# Patient Record
Sex: FEMALE | Race: OTHER | HISPANIC OR LATINO
[De-identification: names, ages, dates, MRNs, and addresses within clinical notes are randomized per-mention and may not be internally consistent; named-entity substitution may affect disease eponyms.]

---

## 2023-04-24 ENCOUNTER — APPOINTMENT (OUTPATIENT)
Dept: ANTEPARTUM | Facility: CLINIC | Age: 28
End: 2023-04-24
Payer: MEDICAID

## 2023-04-24 ENCOUNTER — ASOB RESULT (OUTPATIENT)
Age: 28
End: 2023-04-24

## 2023-04-24 ENCOUNTER — APPOINTMENT (OUTPATIENT)
Dept: ANTEPARTUM | Facility: CLINIC | Age: 28
End: 2023-04-24

## 2023-04-24 PROBLEM — Z00.00 ENCOUNTER FOR PREVENTIVE HEALTH EXAMINATION: Status: ACTIVE | Noted: 2023-04-24

## 2023-04-24 PROCEDURE — 76801 OB US < 14 WKS SINGLE FETUS: CPT

## 2023-04-24 PROCEDURE — 36415 COLL VENOUS BLD VENIPUNCTURE: CPT

## 2023-04-24 PROCEDURE — 76813 OB US NUCHAL MEAS 1 GEST: CPT

## 2023-04-27 LAB
ADDITIONAL US: NORMAL
COMMENTS: AFP: NORMAL
CRL SCAN TWIN B: NORMAL
CRL SCAN: NORMAL
CROWN RUMP LENGTH TWIN B: NORMAL
CROWN RUMP LENGTH: 48.3 MM
DOWN SYNDROME AGE RISK: NORMAL
DOWN SYNDROME INTERPRETATION: NORMAL
DOWN SYNDROME SCREENING RISK: NORMAL
GEST. AGE ON COLLECTION DATE: 11.4 WEEKS
HCG MOM: 0.86
HCG VALUE: 110.4 IU/ML
MATERNAL AGE AT EDD: 28 YR
NOTE: AFP: NORMAL
NT MOM TWIN B: NORMAL
NT TWIN B: NORMAL
NUCHAL TRANSLUCENCY (NT): 1.2 MM
NUCHAL TRANSLUCENCY MOM: 0.92
NUMBER OF FETUSES: 1
PAPP-A MOM: 1.22
PAPP-A VALUE: 941 NG/ML
RACE: NORMAL
RESULTS AFP: NORMAL
SONOGRAPHER ID#: NORMAL
SUBMIT PART 2 SAMPLE USING: NORMAL
TEST RESULTS: AFP: NORMAL
TRISOMY 18 AGE RISK: NORMAL
TRISOMY 18 INTERPRETATION: NORMAL
TRISOMY 18 SCREENING RISK: NORMAL
WEIGHT AFP: 131 LBS

## 2023-05-30 ENCOUNTER — APPOINTMENT (OUTPATIENT)
Dept: ANTEPARTUM | Facility: CLINIC | Age: 28
End: 2023-05-30
Payer: MEDICAID

## 2023-05-30 DIAGNOSIS — N91.2 AMENORRHEA, UNSPECIFIED: ICD-10-CM

## 2023-05-30 DIAGNOSIS — O35.10X0 MATERNAL CARE FOR (SUSPECTED) CHROMOSOMAL ABNORMALITY IN FETUS, UNSPECIFIED, NOT APPLICABLE OR UNSPECIFIED: ICD-10-CM

## 2023-05-30 PROCEDURE — 36415 COLL VENOUS BLD VENIPUNCTURE: CPT

## 2023-06-02 LAB
ADDITIONAL US: NORMAL
AFP MOM: 0.89
AFP VALUE: 33.1 NG/ML
COLLECTED ON 2: NORMAL
COLLECTED ON: NORMAL
CRL SCAN TWIN B: NORMAL
CRL SCAN: NORMAL
CROWN RUMP LENGTH TWIN B: NORMAL
CROWN RUMP LENGTH: 48.3 MM
DIA MOM: 0.96
DIA VALUE: 153.1 PG/ML
DOWN SYNDROME AGE RISK: NORMAL
DOWN SYNDROME INTERPRETATION: NORMAL
DOWN SYNDROME SCREENING RISK: NORMAL
FIRST TRIMESTER SAMPLE: NORMAL
GEST. AGE ON COLLECTION DATE: 11.4 WEEKS
GESTATIONAL AGE: 16.6 WEEKS
HCG MOM: 0.7
HCG VALUE: 24.1 IU/ML
INSULIN DEP DIABETES: NO
MATERNAL AGE AT EDD: 28 YR
NT MOM TWIN B: NORMAL
NT TWIN B: NORMAL
NUCHAL TRANSLUCENCY (NT): 1.2 MM
NUCHAL TRANSLUCENCY MOM: 0.92
NUMBER OF FETUSES: 1
OPEN SPINA BIFIDA: NORMAL
OSB INTERPRETATION: NORMAL
PAPP-A MOM: 1.22
PAPP-A VALUE: 941 NG/ML
RACE: NORMAL
SECOND TRIMESTER SAMPLE: NORMAL
SEQUENTIAL 2 COMMENTS: NORMAL
SEQUENTIAL 2 NOTE: NORMAL
SEQUENTIAL 2 RESULTS: NORMAL
SEQUENTIAL 2 TEST RESULTS: NORMAL
SONOGRAPHER ID#: NORMAL
TRISOMY 18 AGE RISK: NORMAL
TRISOMY 18 INTERPRETATION: NORMAL
TRISOMY 18 SCREENING RISK: NORMAL
UE3 MOM: 1.47
UE3 VALUE: 1.56 NG/ML
WEIGHT AFP: 131 LBS
WEIGHT: 138 LBS

## 2023-06-27 ENCOUNTER — ASOB RESULT (OUTPATIENT)
Age: 28
End: 2023-06-27

## 2023-06-27 ENCOUNTER — APPOINTMENT (OUTPATIENT)
Dept: ANTEPARTUM | Facility: CLINIC | Age: 28
End: 2023-06-27
Payer: MEDICAID

## 2023-06-27 PROCEDURE — 76805 OB US >/= 14 WKS SNGL FETUS: CPT

## 2023-07-03 ENCOUNTER — OUTPATIENT (OUTPATIENT)
Dept: INPATIENT UNIT | Facility: HOSPITAL | Age: 28
LOS: 1 days | End: 2023-07-03
Payer: COMMERCIAL

## 2023-07-03 VITALS — SYSTOLIC BLOOD PRESSURE: 117 MMHG | DIASTOLIC BLOOD PRESSURE: 68 MMHG | HEART RATE: 80 BPM

## 2023-07-03 VITALS
DIASTOLIC BLOOD PRESSURE: 64 MMHG | RESPIRATION RATE: 16 BRPM | TEMPERATURE: 98 F | SYSTOLIC BLOOD PRESSURE: 129 MMHG | HEART RATE: 77 BPM

## 2023-07-03 DIAGNOSIS — O26.899 OTHER SPECIFIED PREGNANCY RELATED CONDITIONS, UNSPECIFIED TRIMESTER: ICD-10-CM

## 2023-07-03 LAB
APPEARANCE UR: CLEAR — SIGNIFICANT CHANGE UP
BILIRUB UR-MCNC: NEGATIVE — SIGNIFICANT CHANGE UP
COLOR SPEC: YELLOW — SIGNIFICANT CHANGE UP
DIFF PNL FLD: ABNORMAL
EPI CELLS # UR: SIGNIFICANT CHANGE UP
GLUCOSE UR QL: NEGATIVE MG/DL — SIGNIFICANT CHANGE UP
KETONES UR-MCNC: NEGATIVE — SIGNIFICANT CHANGE UP
LEUKOCYTE ESTERASE UR-ACNC: NEGATIVE — SIGNIFICANT CHANGE UP
NITRITE UR-MCNC: NEGATIVE — SIGNIFICANT CHANGE UP
PH UR: 6 — SIGNIFICANT CHANGE UP (ref 5–8)
PROT UR-MCNC: 30 MG/DL
RBC CASTS # UR COMP ASSIST: ABNORMAL /HPF (ref 0–4)
SP GR SPEC: 1.02 — SIGNIFICANT CHANGE UP (ref 1.01–1.02)
UROBILINOGEN FLD QL: NEGATIVE MG/DL — SIGNIFICANT CHANGE UP
WBC UR QL: NEGATIVE /HPF — SIGNIFICANT CHANGE UP (ref 0–5)

## 2023-07-03 PROCEDURE — 81001 URINALYSIS AUTO W/SCOPE: CPT

## 2023-07-03 PROCEDURE — 76815 OB US LIMITED FETUS(S): CPT | Mod: 26

## 2023-07-03 PROCEDURE — G0463: CPT

## 2023-07-03 RX ORDER — POLYETHYLENE GLYCOL 3350 17 G/17G
17 POWDER, FOR SOLUTION ORAL ONCE
Refills: 0 | Status: COMPLETED | OUTPATIENT
Start: 2023-07-03 | End: 2023-07-03

## 2023-07-03 RX ORDER — ACETAMINOPHEN 500 MG
975 TABLET ORAL ONCE
Refills: 0 | Status: COMPLETED | OUTPATIENT
Start: 2023-07-03 | End: 2023-07-03

## 2023-07-03 RX ORDER — SIMETHICONE 80 MG/1
80 TABLET, CHEWABLE ORAL ONCE
Refills: 0 | Status: COMPLETED | OUTPATIENT
Start: 2023-07-03 | End: 2023-07-03

## 2023-07-03 RX ADMIN — POLYETHYLENE GLYCOL 3350 17 GRAM(S): 17 POWDER, FOR SOLUTION ORAL at 18:23

## 2023-07-03 RX ADMIN — Medication 975 MILLIGRAM(S): at 20:02

## 2023-07-03 RX ADMIN — SIMETHICONE 80 MILLIGRAM(S): 80 TABLET, CHEWABLE ORAL at 18:24

## 2023-07-03 RX ADMIN — Medication 975 MILLIGRAM(S): at 18:13

## 2023-07-03 NOTE — OB PROVIDER TRIAGE NOTE - HISTORY OF PRESENT ILLNESS
GISELLE ALMANZA is a 27y  at 21wk3d weeks GA who presents to L&D for r/o PTL. To be completed    Pt denies vaginal bleeding, contractions and leakage of fluid. She reports good fetal movement.   Pt denies headaches, visual disturbances, RUQ pain, epigastric pain and new-onset edema.   She denies any urinary complaints. She denies fevers, chills, nausea, vomiting.   She denies shortness of breath, chest pain, and palpitations.    Pregnancy course is  uncomplicated.   Pregnancy course is significant for:    OBHx:  GynHx: -fibroids/-cysts, denied STD hx, denies abnormal PAPs  PMH:  Meds:  SurgHx:  SocHx: Denies tobacco use, EtOH use and illicit drug use during the pregnancy; Feels safe at home  All: NKDA    T(C): 36.8 (23 @ 17:12), Max: 36.8 (23 @ 17:12)  HR: 77 (23 @ 17:17) (77 - 77)  BP: 129/64 (23 @ 17:17) (129/64 - 129/64)  RR: 16 (23 @ 17:12) (16 - 16)  SpO2: --  Gen: NAD, well-appearing  Heart: S1/S2 auscultated with regular rate, normal rhythm.  Lungs: CTAB, no crackles or wheezing  Abd: soft, gravid  Ext: non-edematous, non-tender   SVE:   SSE: cervix visualized, closed with no evidence of bleeding or discharge, no lesions appreciated.    FHT: Cat I, Baseline #bpm, +accels  [place]: Irregular contractions    A/P:     Fetus:  [place]:  Dispo: Continue to observe.     Discussed with    GISELLE ALMANZA is a 27y  at 21wk3d weeks GA who presents to L&D for r/o PTL. Patient reported that she experienced severe pain after bending over and getting back up. She reports pain as 10/10, located in the RLQ. Denies any history of trauma, kidney stones, or surgery.   Pt denies vaginal bleeding, contractions and leakage of fluid.   Pt denies headaches, visual disturbances, RUQ pain, epigastric pain and new-onset edema.   She denies any urinary complaints. She denies fevers, chills, nausea, vomiting.   She denies shortness of breath, chest pain, and palpitations.    Patient interviewed with [place]  ID [id number]    Pregnancy course is otherwise uncomplicated.    OBHx:   GynHx: -fibroids/-cysts, denied STD hx.  PMH: Denies  Meds: PNV  SurgHx: Denies  SocHx: Denies tobacco use, EtOH use and illicit drug use during the pregnancy; Feels safe at home  All: NKDA GISELLE ALMANZA is a 27y  at 21wk3d weeks GA who presents to L&D for r/o PTL. Patient reported that she experienced severe pain after bending over and getting back up. She reports pain as 10/10, located in the RLQ. Denies any history of trauma, kidney stones, or surgery. She reports straining with bowel movement, pain waxes and wanes and migrates.  Pt denies vaginal bleeding, contractions and leakage of fluid.   Pt denies headaches, visual disturbances, RUQ pain, epigastric pain and new-onset edema.   She denies any urinary complaints. She denies fevers, chills, nausea, vomiting.   She denies shortness of breath, chest pain, and palpitations.    Patient interviewed with [place]  ID [id number]    Pregnancy course is otherwise uncomplicated.    OBHx:   GynHx: -fibroids/-cysts, denied STD hx.  PMH: Denies  Meds: PNV  SurgHx: Denies  SocHx: Denies tobacco use, EtOH use and illicit drug use during the pregnancy; Feels safe at home  All: NKDA

## 2023-07-03 NOTE — OB PROVIDER TRIAGE NOTE - NSHPPHYSICALEXAM_GEN_ALL_CORE
T(C): 36.8 (07-03-23 @ 17:12), Max: 36.8 (07-03-23 @ 17:12)  HR: 77 (07-03-23 @ 17:17) (77 - 77)  BP: 129/64 (07-03-23 @ 17:17) (129/64 - 129/64)  RR: 16 (07-03-23 @ 17:12) (16 - 16)    Gen: NAD, well-appearing  Heart: S1/S2 auscultated with regular rate, normal rhythm.  Lungs: CTAB, no crackles or wheezing  Abd: soft, gravid, no CVA tenderness b/l. No TTP.   Ext: non-edematous, non-tender    Bedside Abd Sono: Cephalic, placenta anterior, FHR 150bpm, cervix long and closed.  [place]: No contractions

## 2023-07-03 NOTE — OB PROVIDER TRIAGE NOTE - ATTENDING COMMENTS
G1 at 13cqi2j no signs of previable  labor. Maternal/fetal status reassuring. Discharge home   ppalos

## 2023-07-03 NOTE — OB PROVIDER TRIAGE NOTE - NSOBPROVIDERNOTE_OBGYN_ALL_OB_FT
A/P: GISELLE ALMANZA is a 27y  at 21wk3d weeks GA who presents to L&D for r/o PTL. Patient's physical exam and bedside ultrasound suggest low risk for PTL.    #Abdominal pain  -PTL unlikely  -Tylenol for pain, re-evaluate to see if pain resolves  -F/u with primary Ob    Bedside Abd Sono: Cephalic, placenta anterior, FHR 150bpm, cervix long and closed.  [place]: No contractions  Dispo: Continue to observe, discharge home when pain resolves.    Discussed with Dr. Wylie A/P: GISELLE ALMANZA is a 27y  at 21wk3d weeks GA who presents to L&D for r/o PTL. Patient's physical exam and bedside ultrasound suggest low risk for PTL.    #Abdominal pain  -PTL unlikely  -Tylenol for pain,   -Miralax, Simethicone for possible constipation  -re-evaluate to see if pain resolves  -F/u with primary Ob    Bedside Abd Sono: Cephalic, placenta anterior, FHR 150bpm, cervix long and closed.  [place]: No contractions  Dispo: Continue to observe, discharge home when pain resolves.    Discussed with Dr. Wylie A/P: GISELLE ALMANZA is a 27y  at 21wk3d weeks GA who presents to L&D for r/o PTL. Patient's physical exam and bedside ultrasound suggest low risk for PTL.    #Abdominal pain  -PTL unlikely  -Tylenol for pain,   -Miralax, Simethicone for possible constipation  -re-evaluate to see if pain resolves  -F/u with primary Ob on 23    Bedside Abd Sono: Cephalic, placenta anterior, FHR 150bpm, cervix long and closed.  [place]: No contractions  Dispo: Continue to observe, discharge home with pain improvement    Discussed with Dr. Wylie

## 2023-07-07 DIAGNOSIS — R10.31 RIGHT LOWER QUADRANT PAIN: ICD-10-CM

## 2023-07-07 DIAGNOSIS — Z3A.21 21 WEEKS GESTATION OF PREGNANCY: ICD-10-CM

## 2023-07-07 DIAGNOSIS — O26.892 OTHER SPECIFIED PREGNANCY RELATED CONDITIONS, SECOND TRIMESTER: ICD-10-CM

## 2023-07-11 ENCOUNTER — ASOB RESULT (OUTPATIENT)
Age: 28
End: 2023-07-11

## 2023-07-11 ENCOUNTER — APPOINTMENT (OUTPATIENT)
Dept: ANTEPARTUM | Facility: CLINIC | Age: 28
End: 2023-07-11
Payer: MEDICAID

## 2023-07-11 PROCEDURE — 76816 OB US FOLLOW-UP PER FETUS: CPT

## 2023-08-14 ENCOUNTER — ASOB RESULT (OUTPATIENT)
Age: 28
End: 2023-08-14

## 2023-08-14 ENCOUNTER — APPOINTMENT (OUTPATIENT)
Dept: MATERNAL FETAL MEDICINE | Facility: CLINIC | Age: 28
End: 2023-08-14
Payer: MEDICAID

## 2023-08-14 PROCEDURE — 99442: CPT

## 2023-08-15 ENCOUNTER — APPOINTMENT (OUTPATIENT)
Dept: ANTEPARTUM | Facility: CLINIC | Age: 28
End: 2023-08-15
Payer: MEDICAID

## 2023-08-15 DIAGNOSIS — Z13.71 ENCOUNTER FOR NONPROCREATIVE SCREENING FOR GENETIC DISEASE CARRIER STATUS: ICD-10-CM

## 2023-08-15 PROCEDURE — 36415 COLL VENOUS BLD VENIPUNCTURE: CPT

## 2023-08-28 ENCOUNTER — NON-APPOINTMENT (OUTPATIENT)
Age: 28
End: 2023-08-28

## 2023-09-15 ENCOUNTER — APPOINTMENT (OUTPATIENT)
Dept: ANTEPARTUM | Facility: CLINIC | Age: 28
End: 2023-09-15
Payer: MEDICAID

## 2023-09-15 ENCOUNTER — ASOB RESULT (OUTPATIENT)
Age: 28
End: 2023-09-15

## 2023-09-15 PROCEDURE — 76816 OB US FOLLOW-UP PER FETUS: CPT

## 2023-11-04 ENCOUNTER — INPATIENT (INPATIENT)
Facility: HOSPITAL | Age: 28
LOS: 2 days | Discharge: HOME | End: 2023-11-07
Attending: OBSTETRICS & GYNECOLOGY | Admitting: OBSTETRICS & GYNECOLOGY
Payer: COMMERCIAL

## 2023-11-04 ENCOUNTER — OUTPATIENT (OUTPATIENT)
Dept: OUTPATIENT SERVICES | Facility: HOSPITAL | Age: 28
LOS: 1 days | End: 2023-11-04
Payer: COMMERCIAL

## 2023-11-04 VITALS
DIASTOLIC BLOOD PRESSURE: 74 MMHG | RESPIRATION RATE: 18 BRPM | SYSTOLIC BLOOD PRESSURE: 127 MMHG | HEART RATE: 80 BPM | TEMPERATURE: 99 F

## 2023-11-04 VITALS — HEART RATE: 79 BPM | SYSTOLIC BLOOD PRESSURE: 128 MMHG | DIASTOLIC BLOOD PRESSURE: 81 MMHG

## 2023-11-04 VITALS
SYSTOLIC BLOOD PRESSURE: 125 MMHG | RESPIRATION RATE: 20 BRPM | DIASTOLIC BLOOD PRESSURE: 73 MMHG | HEART RATE: 76 BPM | TEMPERATURE: 98 F

## 2023-11-04 DIAGNOSIS — O26.899 OTHER SPECIFIED PREGNANCY RELATED CONDITIONS, UNSPECIFIED TRIMESTER: ICD-10-CM

## 2023-11-04 DIAGNOSIS — O26.893 OTHER SPECIFIED PREGNANCY RELATED CONDITIONS, THIRD TRIMESTER: ICD-10-CM

## 2023-11-04 LAB
ABO RH CONFIRMATION: SIGNIFICANT CHANGE UP
ABO RH CONFIRMATION: SIGNIFICANT CHANGE UP
BASOPHILS # BLD AUTO: 0.03 K/UL — SIGNIFICANT CHANGE UP (ref 0–0.2)
BASOPHILS # BLD AUTO: 0.03 K/UL — SIGNIFICANT CHANGE UP (ref 0–0.2)
BASOPHILS NFR BLD AUTO: 0.3 % — SIGNIFICANT CHANGE UP (ref 0–2)
BASOPHILS NFR BLD AUTO: 0.3 % — SIGNIFICANT CHANGE UP (ref 0–2)
BLD GP AB SCN SERPL QL: SIGNIFICANT CHANGE UP
BLD GP AB SCN SERPL QL: SIGNIFICANT CHANGE UP
EOSINOPHIL # BLD AUTO: 0.03 K/UL — SIGNIFICANT CHANGE UP (ref 0–0.5)
EOSINOPHIL # BLD AUTO: 0.03 K/UL — SIGNIFICANT CHANGE UP (ref 0–0.5)
EOSINOPHIL NFR BLD AUTO: 0.3 % — SIGNIFICANT CHANGE UP (ref 0–6)
EOSINOPHIL NFR BLD AUTO: 0.3 % — SIGNIFICANT CHANGE UP (ref 0–6)
HCT VFR BLD CALC: 36.9 % — SIGNIFICANT CHANGE UP (ref 34.5–45)
HCT VFR BLD CALC: 36.9 % — SIGNIFICANT CHANGE UP (ref 34.5–45)
HGB BLD-MCNC: 12.4 G/DL — SIGNIFICANT CHANGE UP (ref 11.5–15.5)
HGB BLD-MCNC: 12.4 G/DL — SIGNIFICANT CHANGE UP (ref 11.5–15.5)
IMM GRANULOCYTES NFR BLD AUTO: 0.3 % — SIGNIFICANT CHANGE UP (ref 0–0.9)
IMM GRANULOCYTES NFR BLD AUTO: 0.3 % — SIGNIFICANT CHANGE UP (ref 0–0.9)
LYMPHOCYTES # BLD AUTO: 1.2 K/UL — SIGNIFICANT CHANGE UP (ref 1–3.3)
LYMPHOCYTES # BLD AUTO: 1.2 K/UL — SIGNIFICANT CHANGE UP (ref 1–3.3)
LYMPHOCYTES # BLD AUTO: 11.9 % — LOW (ref 13–44)
LYMPHOCYTES # BLD AUTO: 11.9 % — LOW (ref 13–44)
MCHC RBC-ENTMCNC: 30.5 PG — SIGNIFICANT CHANGE UP (ref 27–34)
MCHC RBC-ENTMCNC: 30.5 PG — SIGNIFICANT CHANGE UP (ref 27–34)
MCHC RBC-ENTMCNC: 33.6 GM/DL — SIGNIFICANT CHANGE UP (ref 32–36)
MCHC RBC-ENTMCNC: 33.6 GM/DL — SIGNIFICANT CHANGE UP (ref 32–36)
MCV RBC AUTO: 90.7 FL — SIGNIFICANT CHANGE UP (ref 80–100)
MCV RBC AUTO: 90.7 FL — SIGNIFICANT CHANGE UP (ref 80–100)
MONOCYTES # BLD AUTO: 0.66 K/UL — SIGNIFICANT CHANGE UP (ref 0–0.9)
MONOCYTES # BLD AUTO: 0.66 K/UL — SIGNIFICANT CHANGE UP (ref 0–0.9)
MONOCYTES NFR BLD AUTO: 6.5 % — SIGNIFICANT CHANGE UP (ref 2–14)
MONOCYTES NFR BLD AUTO: 6.5 % — SIGNIFICANT CHANGE UP (ref 2–14)
NEUTROPHILS # BLD AUTO: 8.15 K/UL — HIGH (ref 1.8–7.4)
NEUTROPHILS # BLD AUTO: 8.15 K/UL — HIGH (ref 1.8–7.4)
NEUTROPHILS NFR BLD AUTO: 80.7 % — HIGH (ref 43–77)
NEUTROPHILS NFR BLD AUTO: 80.7 % — HIGH (ref 43–77)
PLATELET # BLD AUTO: 217 K/UL — SIGNIFICANT CHANGE UP (ref 150–400)
PLATELET # BLD AUTO: 217 K/UL — SIGNIFICANT CHANGE UP (ref 150–400)
RBC # BLD: 4.07 M/UL — SIGNIFICANT CHANGE UP (ref 3.8–5.2)
RBC # BLD: 4.07 M/UL — SIGNIFICANT CHANGE UP (ref 3.8–5.2)
RBC # FLD: 14.1 % — SIGNIFICANT CHANGE UP (ref 10.3–14.5)
RBC # FLD: 14.1 % — SIGNIFICANT CHANGE UP (ref 10.3–14.5)
WBC # BLD: 10.1 K/UL — SIGNIFICANT CHANGE UP (ref 3.8–10.5)
WBC # BLD: 10.1 K/UL — SIGNIFICANT CHANGE UP (ref 3.8–10.5)
WBC # FLD AUTO: 10.1 K/UL — SIGNIFICANT CHANGE UP (ref 3.8–10.5)
WBC # FLD AUTO: 10.1 K/UL — SIGNIFICANT CHANGE UP (ref 3.8–10.5)

## 2023-11-04 PROCEDURE — 83986 ASSAY PH BODY FLUID NOS: CPT

## 2023-11-04 PROCEDURE — G0463: CPT

## 2023-11-04 PROCEDURE — 59025 FETAL NON-STRESS TEST: CPT

## 2023-11-04 RX ORDER — SODIUM CHLORIDE 9 MG/ML
1000 INJECTION, SOLUTION INTRAVENOUS
Refills: 0 | Status: DISCONTINUED | OUTPATIENT
Start: 2023-11-04 | End: 2023-11-07

## 2023-11-04 RX ORDER — OXYTOCIN 10 UNIT/ML
333.33 VIAL (ML) INJECTION
Qty: 20 | Refills: 0 | Status: COMPLETED | OUTPATIENT
Start: 2023-11-04 | End: 2023-11-04

## 2023-11-04 RX ORDER — CHLORHEXIDINE GLUCONATE 213 G/1000ML
1 SOLUTION TOPICAL DAILY
Refills: 0 | Status: DISCONTINUED | OUTPATIENT
Start: 2023-11-04 | End: 2023-11-04

## 2023-11-04 RX ORDER — CITRIC ACID/SODIUM CITRATE 300-500 MG
30 SOLUTION, ORAL ORAL ONCE
Refills: 0 | Status: DISCONTINUED | OUTPATIENT
Start: 2023-11-04 | End: 2023-11-07

## 2023-11-04 RX ORDER — CITRIC ACID/SODIUM CITRATE 300-500 MG
30 SOLUTION, ORAL ORAL ONCE
Refills: 0 | Status: DISCONTINUED | OUTPATIENT
Start: 2023-11-04 | End: 2023-11-04

## 2023-11-04 RX ORDER — OXYTOCIN 10 UNIT/ML
333.33 VIAL (ML) INJECTION
Qty: 20 | Refills: 0 | Status: DISCONTINUED | OUTPATIENT
Start: 2023-11-04 | End: 2023-11-04

## 2023-11-04 RX ORDER — CHLORHEXIDINE GLUCONATE 213 G/1000ML
1 SOLUTION TOPICAL DAILY
Refills: 0 | Status: DISCONTINUED | OUTPATIENT
Start: 2023-11-04 | End: 2023-11-07

## 2023-11-04 RX ORDER — SODIUM CHLORIDE 9 MG/ML
1000 INJECTION, SOLUTION INTRAVENOUS
Refills: 0 | Status: DISCONTINUED | OUTPATIENT
Start: 2023-11-04 | End: 2023-11-04

## 2023-11-04 RX ADMIN — SODIUM CHLORIDE 125 MILLILITER(S): 9 INJECTION, SOLUTION INTRAVENOUS at 15:54

## 2023-11-04 RX ADMIN — SODIUM CHLORIDE 125 MILLILITER(S): 9 INJECTION, SOLUTION INTRAVENOUS at 21:00

## 2023-11-04 RX ADMIN — SODIUM CHLORIDE 125 MILLILITER(S): 9 INJECTION, SOLUTION INTRAVENOUS at 16:45

## 2023-11-04 NOTE — OB PROVIDER TRIAGE NOTE - HISTORY OF PRESENT ILLNESS
GISELLE ALMANZA is a 27y  at 39 weeks 4day GA who presents to L&D for discharge that she noticed overnight. Patient states that she had clear non odorous discharge. Patient denies feeling contractions, but was seen to be grace on monitor, patient states that she is unsure, but does occasionally feel cramping    Pt denies vaginal bleeding, contractions and leakage of fluid. She endorses good fetal movement.   Pt denies trauma. Her last cervical exam was 0/0/-3 on   Pt denies headaches, visual disturbances, RUQ pain, epigastric pain and new-onset edema.     She denies any urinary complaints.   She denies fevers, chills, nausea, vomiting.   She denies shortness of breath, chest pain, and palpitations.    Pregnancy course is  uncomplicated.     POB: G1  PGYN: -fibroids/-cysts, denied STD hx, denies abnormal PAPs  PMH: Denies  PSH: Denies  SH: Denies tobacco use, EtOH use and illicit drug use during the pregnancy; Feels safe at home  Meds: PNV  All: NKDA   GISELLE ALMANZA is a 27y  at 39 weeks 2 day GA who presents to L&D for discharge that she noticed overnight. Patient states that she had clear non odorous discharge. Patient denies feeling contractions, but was seen to be grace on monitor, patient states that she is unsure, but does occasionally feel cramping    Pt denies vaginal bleeding, contractions and leakage of fluid. She endorses good fetal movement.   Pt denies trauma. Her last cervical exam was 0/0/-3 on   Pt denies headaches, visual disturbances, RUQ pain, epigastric pain and new-onset edema.     She denies any urinary complaints.   She denies fevers, chills, nausea, vomiting.   She denies shortness of breath, chest pain, and palpitations.    Pregnancy course is  uncomplicated.     POB: G1  PGYN: -fibroids/-cysts, denied STD hx, denies abnormal PAPs  PMH: Denies  PSH: Denies  SH: Denies tobacco use, EtOH use and illicit drug use during the pregnancy; Feels safe at home  Meds: PNV  All: NKDA

## 2023-11-04 NOTE — OB RN TRIAGE NOTE - FALL HARM RISK - UNIVERSAL INTERVENTIONS
Bed in lowest position, wheels locked, appropriate side rails in place/Call bell, personal items and telephone in reach/Instruct patient to call for assistance before getting out of bed or chair/Non-slip footwear when patient is out of bed/[place] to call system/Physically safe environment - no spills, clutter or unnecessary equipment/Purposeful Proactive Rounding/Room/bathroom lighting operational, light cord in reach

## 2023-11-04 NOTE — OB PROVIDER TRIAGE NOTE - NSOBPROVIDERNOTE_OBGYN_ALL_OB_FT
A/P:  27y  at 39 weeks 4day GA who presents to L&D for discharge that she noticed overnight with contractions    #Rule out rupture  -discharge since last night  -nitrazine negative  -SSE without fluid  -GC/CT and affirm sent    #Rule out labor  -Irregular contractions q4-7 min on monitor  -Patient does not feel contractions  -1/0/-3 on initial triage  -repeat exam    Fetus: Reactive  [place]: q4-7 min; irregular  Dispo: Continue to observe.     Discussed with Dr. Hoover A/P:  27y  at 39 weeks 4day GA who presents to L&D for discharge that she noticed overnight with contractions    #Rule out rupture  -discharge since last night  -nitrazine negative  -SSE without fluid  -GC/CT and affirm sent    #Rule out labor  -Irregular contractions q4-7 min on monitor  -Patient does not feel contractions  -/-3 on initial triage  -repeat exam /-3  -Not in labor    Fetus: Reactive  [place]: q4-7 min; irregular  Dispo: Discharge home with labor precautions and hydration precautions    Discussed with Dr. Hoover

## 2023-11-04 NOTE — OB PROVIDER H&P - ASSESSMENT
A/P:   -Admit to L&D  -Consent  -Admission labs  -NPO, except ice chips   -IV fluids  -Labor: Intact/*ROM. Latent/Active labor. [place] *.   -Fetus: Cat I tracing. Continuous toco and fetal monitoring.   -GBS: Negative, no GBS ppx required   -Analgesia:     Discussed with  _ 27y  at 39w GA by LMP consistent with 1st trimester sono who presents to L&D in labor.    A/P:   -Admit to L&D  -Consent  -Admission labs  -NPO, except ice chips   -IV fluids  -Labor: Intact/. Latent labor. [place] q3-4  -Fetus: Cat I tracing. Continuous toco and fetal monitoring.   -GBS: Negative, no GBS ppx required   -Analgesia: prn  - continue with expt management    Discussed with Dr. Maher

## 2023-11-04 NOTE — OB RN PATIENT PROFILE - URINARY CATHETER
Patient needs a R diag MMG with homa as well as R breast US of RU to assess that symptomatic area. F/U prn based upon imaging.  Clinical exam was normal. Thanks, RLC.
no

## 2023-11-04 NOTE — OB PROVIDER H&P - LABOR: CERVICAL CONSISTENCY
I personally performed the service described in the documentation recorded by the scribe in my presence, and it accurately and completely records my words and actions.
soft

## 2023-11-04 NOTE — OB PROVIDER H&P - ATTENDING COMMENTS
27y  at 39w  admitted in early labor   GBS neg   EFW 3200g  hx of anemia in pregnancy , now resolved after rx with Iv iron and vit B12  plan   expectant management

## 2023-11-04 NOTE — OB PROVIDER TRIAGE NOTE - NSHPPHYSICALEXAM_GEN_ALL_CORE
T(C): 36.8 (11-04-23 @ 02:03), Max: 36.8 (11-04-23 @ 02:03)  HR: 79 (11-04-23 @ 02:02) (79 - 79)  BP: 128/81 (11-04-23 @ 02:02) (128/81 - 128/81)  RR: 20 (11-04-23 @ 02:03) (20 - 20)  SpO2: --  Gen: NAD, well-appearing  Heart: S1 S2, RRR  Lungs: CTAB  Abd: soft, gravid  Ext: non-edematous, non-tender   SVE: 1/0/-3.  SSE: cervix visualized, closed and without any signs of bleeding or drainage, no pooling   FHT: 140 baseline, moderate variability, + accels, -decels  [place]: q4-7 min

## 2023-11-04 NOTE — OB PROVIDER H&P - HISTORY OF PRESENT ILLNESS
27y  at 39w4d GA by LMP consistent with 1st trimester sono who presents to L&D for contractions. Patient denies vaginal bleeding. She reports possible leakage of fluid since noon today. She endorses good fetal movement. Denies fevers, chills, nausea, vomiting, chest pain, SOB, dizziness and headache. No other complaints at this time.   GABY: 23  LMP: 23    Prenatal course is significant for:  anemia    POB:   PGYN: -fibroids, -ovarian cysts, denies STD hx, denies abnormal PAPs   PMH: Denies  PSH: Denies  SH: Denies EtOH, tobacco and illicit drug use during this pregnancy; feels safe at home   Meds: PNVs, vit B12, IV iron  Allergies: NKDA    Sono:  EFW:    27y  at 39w GA by LMP consistent with 1st trimester sono who presents to L&D for contractions. Patient denies vaginal bleeding. She reports possible leakage of fluid since noon today. She endorses good fetal movement. Denies fevers, chills, nausea, vomiting, chest pain, SOB, dizziness and headache. No other complaints at this time.   GABY: 23  LMP: 23    Prenatal course is significant for:  anemia    POB:   PGYN: -fibroids, -ovarian cysts, denies STD hx, denies abnormal PAPs   PMH: Denies  PSH: Denies  SH: Denies EtOH, tobacco and illicit drug use during this pregnancy; feels safe at home   Meds: PNVs, vit B12, IV iron  Allergies: NKDA

## 2023-11-04 NOTE — OB PROVIDER H&P - NSHPPHYSICALEXAM_GEN_ALL_CORE
T(C): 37.0 (11-04-23 @ 15:49), Max: 37.0 (11-04-23 @ 15:49)  HR: 102 (11-04-23 @ 16:42) (61 - 102)  BP: 127/72 (11-04-23 @ 16:37) (125/73 - 138/81)  RR: 18 (11-04-23 @ 15:49) (18 - 20)  SpO2: 100% (11-04-23 @ 16:42) (99% - 100%)    Gen: NAD, well-appearing, AAOx3   Abd: Soft, gravid  Ext: non-tender, non-edematous  SSE: deferred  SVE:  3/70/-2, intact  Bedside sono: vertex  FHT: baseline 150, moderate variability, +accels, -decels   [place]: ctx q2-4 min

## 2023-11-05 ENCOUNTER — TRANSCRIPTION ENCOUNTER (OUTPATIENT)
Age: 28
End: 2023-11-05

## 2023-11-05 LAB
T PALLIDUM AB TITR SER: NEGATIVE — SIGNIFICANT CHANGE UP
T PALLIDUM AB TITR SER: NEGATIVE — SIGNIFICANT CHANGE UP

## 2023-11-05 RX ORDER — HYDROCORTISONE 1 %
1 OINTMENT (GRAM) TOPICAL EVERY 6 HOURS
Refills: 0 | Status: DISCONTINUED | OUTPATIENT
Start: 2023-11-05 | End: 2023-11-07

## 2023-11-05 RX ORDER — GENTAMICIN SULFATE 40 MG/ML
300 VIAL (ML) INJECTION ONCE
Refills: 0 | Status: COMPLETED | OUTPATIENT
Start: 2023-11-05 | End: 2023-11-05

## 2023-11-05 RX ORDER — LANOLIN
1 OINTMENT (GRAM) TOPICAL EVERY 6 HOURS
Refills: 0 | Status: DISCONTINUED | OUTPATIENT
Start: 2023-11-05 | End: 2023-11-07

## 2023-11-05 RX ORDER — BENZOCAINE 10 %
1 GEL (GRAM) MUCOUS MEMBRANE EVERY 6 HOURS
Refills: 0 | Status: DISCONTINUED | OUTPATIENT
Start: 2023-11-05 | End: 2023-11-07

## 2023-11-05 RX ORDER — OXYTOCIN 10 UNIT/ML
41.67 VIAL (ML) INJECTION
Qty: 20 | Refills: 0 | Status: DISCONTINUED | OUTPATIENT
Start: 2023-11-05 | End: 2023-11-07

## 2023-11-05 RX ORDER — SODIUM CHLORIDE 9 MG/ML
1000 INJECTION, SOLUTION INTRAVENOUS ONCE
Refills: 0 | Status: COMPLETED | OUTPATIENT
Start: 2023-11-05 | End: 2023-11-05

## 2023-11-05 RX ORDER — OXYCODONE HYDROCHLORIDE 5 MG/1
5 TABLET ORAL
Refills: 0 | Status: DISCONTINUED | OUTPATIENT
Start: 2023-11-05 | End: 2023-11-07

## 2023-11-05 RX ORDER — OXYTOCIN 10 UNIT/ML
VIAL (ML) INJECTION
Qty: 30 | Refills: 0 | Status: DISCONTINUED | OUTPATIENT
Start: 2023-11-05 | End: 2023-11-05

## 2023-11-05 RX ORDER — MAGNESIUM HYDROXIDE 400 MG/1
30 TABLET, CHEWABLE ORAL
Refills: 0 | Status: DISCONTINUED | OUTPATIENT
Start: 2023-11-05 | End: 2023-11-07

## 2023-11-05 RX ORDER — IBUPROFEN 200 MG
600 TABLET ORAL EVERY 6 HOURS
Refills: 0 | Status: COMPLETED | OUTPATIENT
Start: 2023-11-05 | End: 2024-10-03

## 2023-11-05 RX ORDER — KETOROLAC TROMETHAMINE 30 MG/ML
30 SYRINGE (ML) INJECTION ONCE
Refills: 0 | Status: DISCONTINUED | OUTPATIENT
Start: 2023-11-05 | End: 2023-11-05

## 2023-11-05 RX ORDER — IBUPROFEN 200 MG
600 TABLET ORAL EVERY 6 HOURS
Refills: 0 | Status: DISCONTINUED | OUTPATIENT
Start: 2023-11-05 | End: 2023-11-07

## 2023-11-05 RX ORDER — AER TRAVELER 0.5 G/1
1 SOLUTION RECTAL; TOPICAL EVERY 4 HOURS
Refills: 0 | Status: DISCONTINUED | OUTPATIENT
Start: 2023-11-05 | End: 2023-11-07

## 2023-11-05 RX ORDER — TETANUS TOXOID, REDUCED DIPHTHERIA TOXOID AND ACELLULAR PERTUSSIS VACCINE, ADSORBED 5; 2.5; 8; 8; 2.5 [IU]/.5ML; [IU]/.5ML; UG/.5ML; UG/.5ML; UG/.5ML
0.5 SUSPENSION INTRAMUSCULAR ONCE
Refills: 0 | Status: COMPLETED | OUTPATIENT
Start: 2023-11-05

## 2023-11-05 RX ORDER — SODIUM CHLORIDE 9 MG/ML
3 INJECTION INTRAMUSCULAR; INTRAVENOUS; SUBCUTANEOUS EVERY 8 HOURS
Refills: 0 | Status: DISCONTINUED | OUTPATIENT
Start: 2023-11-05 | End: 2023-11-07

## 2023-11-05 RX ORDER — AMPICILLIN TRIHYDRATE 250 MG
2 CAPSULE ORAL EVERY 6 HOURS
Refills: 0 | Status: DISCONTINUED | OUTPATIENT
Start: 2023-11-05 | End: 2023-11-05

## 2023-11-05 RX ORDER — OXYCODONE HYDROCHLORIDE 5 MG/1
5 TABLET ORAL ONCE
Refills: 0 | Status: DISCONTINUED | OUTPATIENT
Start: 2023-11-05 | End: 2023-11-07

## 2023-11-05 RX ORDER — SIMETHICONE 80 MG/1
80 TABLET, CHEWABLE ORAL EVERY 4 HOURS
Refills: 0 | Status: DISCONTINUED | OUTPATIENT
Start: 2023-11-05 | End: 2023-11-07

## 2023-11-05 RX ORDER — AMPICILLIN TRIHYDRATE 250 MG
CAPSULE ORAL
Refills: 0 | Status: DISCONTINUED | OUTPATIENT
Start: 2023-11-05 | End: 2023-11-05

## 2023-11-05 RX ORDER — ACETAMINOPHEN 500 MG
1000 TABLET ORAL ONCE
Refills: 0 | Status: COMPLETED | OUTPATIENT
Start: 2023-11-05 | End: 2023-11-05

## 2023-11-05 RX ORDER — ACETAMINOPHEN 500 MG
975 TABLET ORAL
Refills: 0 | Status: DISCONTINUED | OUTPATIENT
Start: 2023-11-05 | End: 2023-11-07

## 2023-11-05 RX ORDER — DIBUCAINE 1 %
1 OINTMENT (GRAM) RECTAL EVERY 6 HOURS
Refills: 0 | Status: DISCONTINUED | OUTPATIENT
Start: 2023-11-05 | End: 2023-11-07

## 2023-11-05 RX ORDER — AMPICILLIN TRIHYDRATE 250 MG
2 CAPSULE ORAL ONCE
Refills: 0 | Status: COMPLETED | OUTPATIENT
Start: 2023-11-05 | End: 2023-11-05

## 2023-11-05 RX ORDER — PRAMOXINE HYDROCHLORIDE 150 MG/15G
1 AEROSOL, FOAM RECTAL EVERY 4 HOURS
Refills: 0 | Status: DISCONTINUED | OUTPATIENT
Start: 2023-11-05 | End: 2023-11-07

## 2023-11-05 RX ORDER — DIPHENHYDRAMINE HCL 50 MG
25 CAPSULE ORAL EVERY 6 HOURS
Refills: 0 | Status: DISCONTINUED | OUTPATIENT
Start: 2023-11-05 | End: 2023-11-07

## 2023-11-05 RX ADMIN — Medication 1000 MILLIUNIT(S)/MIN: at 14:13

## 2023-11-05 RX ADMIN — Medication 975 MILLIGRAM(S): at 21:24

## 2023-11-05 RX ADMIN — Medication 1000 MILLIGRAM(S): at 06:11

## 2023-11-05 RX ADMIN — Medication 300 MILLIGRAM(S): at 06:15

## 2023-11-05 RX ADMIN — Medication 200 GRAM(S): at 18:12

## 2023-11-05 RX ADMIN — Medication 30 MILLIGRAM(S): at 15:45

## 2023-11-05 RX ADMIN — Medication 400 MILLIGRAM(S): at 05:19

## 2023-11-05 RX ADMIN — SODIUM CHLORIDE 1000 MILLILITER(S): 9 INJECTION, SOLUTION INTRAVENOUS at 02:25

## 2023-11-05 RX ADMIN — Medication 200 GRAM(S): at 05:39

## 2023-11-05 RX ADMIN — Medication 2 MILLIUNIT(S)/MIN: at 06:15

## 2023-11-05 RX ADMIN — SODIUM CHLORIDE 3 MILLILITER(S): 9 INJECTION INTRAMUSCULAR; INTRAVENOUS; SUBCUTANEOUS at 21:39

## 2023-11-05 RX ADMIN — Medication 200 GRAM(S): at 11:48

## 2023-11-05 NOTE — DISCHARGE NOTE OB - CARE PLAN
Principal Discharge DX:	Normal vaginal delivery  Assessment and plan of treatment:	Patient should transition to regular activity level. Resume regular diet. Patient should follow up with her OB for postpartum checkup in 2-3 weeks. Patient should call her doctor sooner if she develops fever or uncontrolled vaginal bleeding. Take medications as directed. Exclusive breast feeding for the first 6 months is recommended. Nothing per vagina for 6 weeks (incl. sex, douching, etc). If you have additional concerns, please inform your provider.   1

## 2023-11-05 NOTE — OB PROVIDER DELIVERY SUMMARY - NSPROVIDERDELIVERYNOTE_OBGYN_ALL_OB_FT
Patient felt rectal pressure and was found to be fully dilated, 0 station. She pushed effectively for 20 minutes, and delivered a viable male infant at 1338. Vertex delivered without difficulty in OA position. His name is Chaitanya! Nuchal cord noted x1. Compound hand was noted. Shoulders then delivered without difficulty.  Delayed cord clamping for approximately 30 seconds, and skin to skin was initiated. Cord segment was clamped and cut for cord blood collection. Placenta delivered spontaneously and intact at 1348. Pitocin started. Uterus, cervix, perineum and vagina were inspected and a left periurethral laceration and 2nd degree perineal laceration were noted and repaired with chromic suture. Excellent hemostasis was achieved. Apgars 8&9, EBL 156cc. Patient felt rectal pressure and was found to be fully dilated, 0 station. She pushed effectively for 20 minutes, and delivered a viable male infant at 1338. Vertex delivered without difficulty in direct OA position, compound with hand.  His name is Chaitanya! Nuchal cord noted x1.  Cord clamped and cut at perineum.  Shoulders and body then delivered without difficulty.  Skin to skin was initiated. Cord segment was clamped and cut for cord blood collection. Placenta delivered spontaneously and intact at 1348. Pitocin started. Uterus, cervix, perineum and vagina were inspected and a left periurethral laceration and 2nd degree perineal laceration were noted and repaired with chromic suture. Excellent hemostasis was achieved. Apgars 8&9, EBL 156cc.

## 2023-11-05 NOTE — OB PROVIDER LABOR PROGRESS NOTE - NS_SUBJECTIVE/OBJECTIVE_OBGYN_ALL_OB_FT
no complaints  s/p epidural
Patient endorses some pressure; no pain.
Patient endorsing pressure.
Pt seen and examined at bedside

## 2023-11-05 NOTE — OB PROVIDER LABOR PROGRESS NOTE - NS_OBIHIFHRDETAILS_OBGYN_ALL_OB_FT
cat 1
Cat 1 tracing
Cat 1; prior fetal tachycardia noted
baseline 160, moderate variability, +accels, -decels
Cat 1

## 2023-11-05 NOTE — OB PROVIDER IHI INDUCTION/AUGMENTATION NOTE - NS_EFW_OBGYN_ALL_OB_FT
Bedside, Verbal and Written shift change report given to [address] [place] [address]  (oncoming nurse) by Clare Samuels (offgoing nurse). Report included the following information SBAR, Kardex, Intake/Output, MAR, Recent Results and Alarm Parameters . [id number]

## 2023-11-05 NOTE — DISCHARGE NOTE OB - NS MD DC FALL RISK RISK
For information on Fall & Injury Prevention, visit: [email or web address] OR  [email or web address] OR  https://www.cdc.gov/steadi/patient.html

## 2023-11-05 NOTE — OB RN DELIVERY SUMMARY - NS_SEPSISRSKCALC_OBGYN_ALL_OB_FT
EOS calculated successfully. EOS Risk Factor: 0.5/1000 live births ([facility] national incidence); GA=39w1d; Temp=100.58; ROM=20.6; GBS='Negative'; Antibiotics='Broad spectrum antibiotics > 4 hrs prior to birth'
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
## 2023-11-05 NOTE — DISCHARGE NOTE OB - PATIENT PORTAL LINK FT
You can access the FollowMyHealth Patient Portal offered by [facility] by registering at the following website: [email or web address]. By joining [facility]’s FollowMyHealth portal, you will also be able to view your health information using other applications (apps) compatible with our system.

## 2023-11-05 NOTE — OB PROVIDER DELIVERY SUMMARY - NSLOWPPHRISK_OBGYN_A_OB
No previous uterine incision/Jim[place]nez Pregnancy/Less than or equal to 4 previous vaginal births/No known bleeding disorder/No history of postpartum hemorrhage/No other PPH risks indicated

## 2023-11-05 NOTE — OB RN DELIVERY SUMMARY - NSSELHIDDEN_OBGYN_ALL_OB_FT
[[email or web address]_ALL_OB_FT:[id number]],[NS_DeliveryAssist1_OBGYN_ALL_OB_FT:[id number]],[NS_DeliveryRN_OBGYN_ALL_OB_FT:[id number]]

## 2023-11-05 NOTE — DISCHARGE NOTE OB - CARE PROVIDER_API CALL
Ashley Hoover  Obstetrics and Gynecology  [address] [place] [address], [address]  [place] [address]  Phone: [phone]  Fax: [phone]  Follow Up Time:

## 2023-11-05 NOTE — DISCHARGE NOTE OB - MEDICATION SUMMARY - MEDICATIONS TO TAKE
I will START or STAY ON the medications listed below when I get home from the hospital:    ibuprofen 600 mg oral tablet  -- 1 tab(s) by mouth every 6 hours  -- Indication: For pain    acetaminophen 325 mg oral tablet  -- 3 tab(s) by mouth every 8 hours  -- Indication: For pain

## 2023-11-05 NOTE — OB PROVIDER LABOR PROGRESS NOTE - ASSESSMENT
Cat 1 tracing   - Patient can be started on low dose Pitocin   - monitor closely     D/w Dr. Arnett 
- IUPC in place   - Patient febrile to 38.1C; suspected IAI, will treat with abx - Ampicillin/Gentamicin,/Ofirmev  - Patient recieving IVF   -continue to monitor    D/w Dr. Arnett   
Received patient on PM sign out    27y  at 39w GA by LMP currently admitted for labor    Plan:  - VSS  - Reactive tracing  - S/p AROM, clear fluid  - C/w expectant management  - Continuous FHT/[place]  - Maternal/fetal status reassuring 
Cervical exam unchanged  IUPC placed  Regular ctx  FHT cat I with periods of fetal tachycardia. IVF bolus running
VE 4/80/-2 vtx AROM clear fluid   plan   expectant management 
Patient here in labor  - Expt management   - Patient progressing   - Epidural in place   - continue to monitor    D/w Dr. Arnett

## 2023-11-06 DIAGNOSIS — D62 ACUTE POSTHEMORRHAGIC ANEMIA: ICD-10-CM

## 2023-11-06 LAB
HCT VFR BLD CALC: 28.9 % — LOW (ref 34.5–45)
HCT VFR BLD CALC: 28.9 % — LOW (ref 34.5–45)
HGB BLD-MCNC: 9.7 G/DL — LOW (ref 11.5–15.5)
HGB BLD-MCNC: 9.7 G/DL — LOW (ref 11.5–15.5)
MCHC RBC-ENTMCNC: 30.5 PG — SIGNIFICANT CHANGE UP (ref 27–34)
MCHC RBC-ENTMCNC: 30.5 PG — SIGNIFICANT CHANGE UP (ref 27–34)
MCHC RBC-ENTMCNC: 33.6 GM/DL — SIGNIFICANT CHANGE UP (ref 32–36)
MCHC RBC-ENTMCNC: 33.6 GM/DL — SIGNIFICANT CHANGE UP (ref 32–36)
MCV RBC AUTO: 90.9 FL — SIGNIFICANT CHANGE UP (ref 80–100)
MCV RBC AUTO: 90.9 FL — SIGNIFICANT CHANGE UP (ref 80–100)
PLATELET # BLD AUTO: 174 K/UL — SIGNIFICANT CHANGE UP (ref 150–400)
PLATELET # BLD AUTO: 174 K/UL — SIGNIFICANT CHANGE UP (ref 150–400)
RBC # BLD: 3.18 M/UL — LOW (ref 3.8–5.2)
RBC # BLD: 3.18 M/UL — LOW (ref 3.8–5.2)
RBC # FLD: 14.6 % — HIGH (ref 10.3–14.5)
RBC # FLD: 14.6 % — HIGH (ref 10.3–14.5)
WBC # BLD: 18.44 K/UL — HIGH (ref 3.8–10.5)
WBC # BLD: 18.44 K/UL — HIGH (ref 3.8–10.5)
WBC # FLD AUTO: 18.44 K/UL — HIGH (ref 3.8–10.5)
WBC # FLD AUTO: 18.44 K/UL — HIGH (ref 3.8–10.5)

## 2023-11-06 RX ORDER — IBUPROFEN 200 MG
1 TABLET ORAL
Qty: 120 | Refills: 0
Start: 2023-11-06 | End: 2023-12-05

## 2023-11-06 RX ORDER — ACETAMINOPHEN 500 MG
3 TABLET ORAL
Qty: 270 | Refills: 0
Start: 2023-11-06 | End: 2023-12-05

## 2023-11-06 RX ADMIN — Medication 1 TABLET(S): at 12:20

## 2023-11-06 RX ADMIN — Medication 600 MILLIGRAM(S): at 05:12

## 2023-11-06 RX ADMIN — Medication 975 MILLIGRAM(S): at 08:58

## 2023-11-06 RX ADMIN — Medication 975 MILLIGRAM(S): at 09:28

## 2023-11-06 RX ADMIN — Medication 600 MILLIGRAM(S): at 12:49

## 2023-11-06 RX ADMIN — SODIUM CHLORIDE 3 MILLILITER(S): 9 INJECTION INTRAMUSCULAR; INTRAVENOUS; SUBCUTANEOUS at 05:20

## 2023-11-06 RX ADMIN — Medication 600 MILLIGRAM(S): at 12:19

## 2023-11-06 NOTE — PROGRESS NOTE ADULT - SUBJECTIVE AND OBJECTIVE BOX
GISELLE ALMANZA is a 27y  now PPD#1 s/p spontaneous vaginal delivery at 39w3d gestation, c/b chorio s/p amp/gent/ofirmev. Compound hand, 2nd degree laceration and periurethral laceration repaired with suture.     S:    No acute events overnight.   The patient has no complaints.  Pain controlled with current treatment regimen.   She is ambulating without difficulty and tolerating PO.   + flatus/-BM/+ voiding   She endorses appropriate lochia, which is decreasing.   She is breastfeeding without difficulty.   She denies fevers, chills, nausea and vomiting.   She denies lightheadedness, dizziness, palpitations, chest pain and SOB.     O:    T(C): 36.6 (23 @ 04:20), Max: 37.3 (23 @ 07:24)  HR: 68 (23 @ 04:20) (53 - 86)  BP: 102/66 (23 @ 04:20) (102/66 - 145/79)  RR: 16 (23 @ 04:20) (16 - 18)  SpO2: 96% (23 @ 04:20) (96% - 98%)    Gen: NAD, AOx3, resting comfortably on room air   Abdomen:  Soft, non-tender, non-distended  Uterus:  Fundus firm below umbilicus  VE:  Expected lochia  Ext:  b/l LE non-tender                           12.4   10.10 )-----------( 217      ( 2023 16:17 )             36.9            GISELLE ALMANZA is a 27y  now PPD#1 s/p spontaneous vaginal delivery at 39w3d gestation, c/b chorio s/p amp/gent/ofirmev. Compound hand, 2nd degree laceration and periurethral laceration repaired with suture.     S:    No acute events overnight.   The patient has no complaints.  Pain controlled with current treatment regimen.   She is ambulating without difficulty and tolerating PO.   + flatus/-BM/+ voiding   She endorses appropriate lochia, which is decreasing.   She is breastfeeding without difficulty.   She denies fevers, chills, nausea and vomiting.   She denies lightheadedness, dizziness, palpitations, chest pain and SOB.     O:    T(C): 36.6 (23 @ 04:20), Max: 37.3 (23 @ 07:24)  HR: 68 (23 @ 04:20) (53 - 86)  BP: 102/66 (23 @ 04:20) (102/66 - 145/79)  RR: 16 (23 @ 04:20) (16 - 18)  SpO2: 96% (23 @ 04:20) (96% - 98%)    Gen: NAD, AOx3, resting comfortably on room air   Abdomen:  Soft, non-tender, non-distended  Uterus:  Fundus firm below umbilicus  VE:  Expected lochia  Ext:  b/l LE non-tender                           12.4   10.10 )-----------( 217      ( 2023 16:17 )             36.9                             9.7    18.44 )-----------( 174      ( 2023 05:51 )             28.9

## 2023-11-06 NOTE — PROGRESS NOTE ADULT - SUBJECTIVE AND OBJECTIVE BOX
INTERVAL HPI/OVERNIGHT EVENTS:  27y Female s/p labor epidural, on 11/4/23.    Vital Signs Last 24 Hrs  T(C): 36.6 (06 Nov 2023 04:20), Max: 37.3 (05 Nov 2023 11:54)  T(F): 97.9 (06 Nov 2023 04:20), Max: 99.14 (05 Nov 2023 11:54)  HR: 68 (06 Nov 2023 04:20) (53 - 86)  BP: 102/66 (06 Nov 2023 04:20) (102/66 - 145/79)  BP(mean): --  RR: 16 (06 Nov 2023 04:20) (16 - 18)  SpO2: 96% (06 Nov 2023 04:20) (96% - 98%)    Parameters below as of 06 Nov 2023 04:20  Patient On (Oxygen Delivery Method): room air            Patient's overall anesthesia satisfaction: satisfied    Patient seen and doing well     No headache      No residual numbness or weakness, sensory and motor function intact      No anesthetic complications or complaints noted or reported

## 2023-11-06 NOTE — PROGRESS NOTE ADULT - ASSESSMENT
A/P:  GISELLE ALMANZA is a 27y  now PPD#1 s/p spontaneous vaginal delivery at 39w3d gestation, c/b chorio s/p amp/gent/ofirmev. Compound hand, 2nd degree laceration and periurethral laceration repaired with suture.   -Vital signs stable  -Hgb: 12.4 -> AM labs pending   -Voiding, tolerating PO  -Advance care as tolerated   -Continue routine postpartum care and education  -Healthy male infant, declines circumcision    -Dispo: Patient to be discharged when meeting all postpartum milestones and pending attending approval.      A/P:  GISELLE ALMANZA is a 27y  now PPD#1 s/p spontaneous vaginal delivery at 39w3d gestation, c/b chorio s/p amp/gent/ofirmev. Compound hand, 2nd degree laceration and periurethral laceration repaired with suture.   -Vital signs stable  -Hgb: 12.4 -> 9.7 asymptomatic   -Voiding, tolerating PO  -Advance care as tolerated   -Continue routine postpartum care and education  -Healthy male infant, declines circumcision    -Dispo: Patient to be discharged when meeting all postpartum milestones and pending attending approval.

## 2023-11-06 NOTE — PROGRESS NOTE ADULT - ATTENDING COMMENTS
a 27y  now stable PPD#1 s/p spontaneous vaginal delivery, c/b chorio s/p amp/gent/ofirmev with no s/s endometritis a 27y  now stable PPD#1 s/p spontaneous vaginal delivery, c/b chorio s/p amp/gent/ofirmev with no s/s endometritis, asymptomatic acute anemia, resume PO iron upon discharge.

## 2023-11-07 VITALS
RESPIRATION RATE: 18 BRPM | HEART RATE: 62 BPM | SYSTOLIC BLOOD PRESSURE: 118 MMHG | DIASTOLIC BLOOD PRESSURE: 69 MMHG | TEMPERATURE: 98 F | OXYGEN SATURATION: 97 %

## 2023-11-07 DIAGNOSIS — Z3A.39 39 WEEKS GESTATION OF PREGNANCY: ICD-10-CM

## 2023-11-07 DIAGNOSIS — O47.1 FALSE LABOR AT OR AFTER 37 COMPLETED WEEKS OF GESTATION: ICD-10-CM

## 2023-11-07 PROCEDURE — 36415 COLL VENOUS BLD VENIPUNCTURE: CPT

## 2023-11-07 PROCEDURE — 90686 IIV4 VACC NO PRSV 0.5 ML IM: CPT

## 2023-11-07 PROCEDURE — 86900 BLOOD TYPING SEROLOGIC ABO: CPT

## 2023-11-07 PROCEDURE — 86901 BLOOD TYPING SEROLOGIC RH(D): CPT

## 2023-11-07 PROCEDURE — 85025 COMPLETE CBC W/AUTO DIFF WBC: CPT

## 2023-11-07 PROCEDURE — 86780 TREPONEMA PALLIDUM: CPT

## 2023-11-07 PROCEDURE — 90715 TDAP VACCINE 7 YRS/> IM: CPT

## 2023-11-07 PROCEDURE — 86850 RBC ANTIBODY SCREEN: CPT

## 2023-11-07 PROCEDURE — T1013: CPT

## 2023-11-07 PROCEDURE — 85027 COMPLETE CBC AUTOMATED: CPT

## 2023-11-07 PROCEDURE — 90707 MMR VACCINE SC: CPT

## 2023-11-07 RX ORDER — INFLUENZA VIRUS VACCINE 15; 15; 15; 15 UG/.5ML; UG/.5ML; UG/.5ML; UG/.5ML
0.5 SUSPENSION INTRAMUSCULAR ONCE
Refills: 0 | Status: COMPLETED | OUTPATIENT
Start: 2023-11-07 | End: 2023-11-07

## 2023-11-07 RX ORDER — TETANUS TOXOID, REDUCED DIPHTHERIA TOXOID AND ACELLULAR PERTUSSIS VACCINE, ADSORBED 5; 2.5; 8; 8; 2.5 [IU]/.5ML; [IU]/.5ML; UG/.5ML; UG/.5ML; UG/.5ML
0.5 SUSPENSION INTRAMUSCULAR ONCE
Refills: 0 | Status: COMPLETED | OUTPATIENT
Start: 2023-11-07 | End: 2023-11-07

## 2023-11-07 RX ADMIN — Medication 1 TABLET(S): at 11:53

## 2023-11-07 RX ADMIN — INFLUENZA VIRUS VACCINE 0.5 MILLILITER(S): 15; 15; 15; 15 SUSPENSION INTRAMUSCULAR at 05:55

## 2023-11-07 RX ADMIN — Medication 600 MILLIGRAM(S): at 11:54

## 2023-11-07 RX ADMIN — Medication 600 MILLIGRAM(S): at 00:27

## 2023-11-07 RX ADMIN — TETANUS TOXOID, REDUCED DIPHTHERIA TOXOID AND ACELLULAR PERTUSSIS VACCINE, ADSORBED 0.5 MILLILITER(S): 5; 2.5; 8; 8; 2.5 SUSPENSION INTRAMUSCULAR at 05:54

## 2023-11-07 RX ADMIN — Medication 975 MILLIGRAM(S): at 09:01

## 2023-11-07 RX ADMIN — Medication 600 MILLIGRAM(S): at 05:54

## 2023-11-07 RX ADMIN — Medication 0.5 MILLILITER(S): at 08:59

## 2023-11-07 RX ADMIN — Medication 975 MILLIGRAM(S): at 09:31

## 2023-11-07 NOTE — PROGRESS NOTE ADULT - SUBJECTIVE AND OBJECTIVE BOX
GISELLE ALMANZA is a 27y  now PPD#2 s/p spontaneous vaginal delivery at 39w3d gestation, c/b chorio s/p amp/gent/ofirmev. Compound hand, 2nd degree laceration and periurethral laceration repaired with suture.     S:    No acute events overnight.   The patient has no complaints.  Pain controlled with current treatment regimen.   She is ambulating without difficulty and tolerating PO.   + flatus/-BM/+ voiding   She endorses appropriate lochia, which is decreasing.   She is breastfeeding without difficulty.   She denies fevers, chills, nausea and vomiting.   She denies lightheadedness, dizziness, palpitations, chest pain and SOB.     O:    Vital Signs Last 24 Hrs  T(C): 36.4 (2023 04:25), Max: 37 (2023 15:40)  T(F): 97.5 (2023 04:25), Max: 98.6 (2023 15:40)  HR: 62 (2023 04:25) (62 - 70)  BP: 118/69 (2023 04:25) (103/68 - 118/69)  RR: 18 (2023 04:25) (18 - 18)  SpO2: 97% (2023 04:25) (97% - 98%)      Gen: NAD, AOx3, resting comfortably on room air   Abdomen:  Soft, non-tender, non-distended  Uterus:  Fundus firm below umbilicus  VE:  Expected lochia  Ext:  b/l LE non-tender                           12.4   10.10 )-----------( 217      ( 2023 16:17 )             36.9                             9.7    18.44 )-----------( 174      ( 2023 05:51 )             28.9

## 2023-11-07 NOTE — PROGRESS NOTE ADULT - ASSESSMENT
A/P:  GISELLE ALMANZA is a 27y  now PPD#2 s/p spontaneous vaginal delivery at 39w3d gestation, c/b chorio s/p amp/gent/ofirmev. Compound hand, 2nd degree laceration and periurethral laceration repaired with suture.   -Vital signs stable  -Hgb: 12.4 -> 9.7 asymptomatic   -Voiding, tolerating PO  -Advance care as tolerated   -Continue routine postpartum care and education  -Healthy male infant, declines circumcision    -Dispo: Patient to be discharged home today.

## 2023-11-07 NOTE — PROGRESS NOTE ADULT - SUBJECTIVE AND OBJECTIVE BOX
S: Patient doing well. Minimal lochia. No complaints.     O: Vital Signs Last 24 Hrs  T(C): 36.4 (2023 04:25), Max: 37 (2023 15:40)  T(F): 97.5 (2023 04:25), Max: 98.6 (2023 15:40)  HR: 62 (2023 04:25) (62 - 70)  BP: 118/69 (2023 04:25) (103/68 - 118/69)  BP(mean): --  RR: 18 (2023 04:25) (18 - 18)  SpO2: 97% (2023 04:25) (97% - 98%)    Parameters below as of 2023 04:25  Patient On (Oxygen Delivery Method): room air        Gen: NAD  Breast : breast feeding  Abd: soft, NT, ND, fundus firm below umbilicus  lochia mild, voiding well  Ext: no tenderness    Labs:                        9.7    18.44 )-----------( 174      ( 2023 05:51 )             28.9            PP # 2 s/p     Doing well.  Discharge home today  Nothing in vagina and no heavy lifting for 6 weeks.   Follow up 4 weeks for post partum visit.  Call office for any fevers, chills, heavy vaginal bleeding, symptoms of depression, or any other concerning symptoms.  Continue motrin 600 mg every 6 hours.

## 2023-11-10 ENCOUNTER — EMERGENCY (EMERGENCY)
Facility: HOSPITAL | Age: 28
LOS: 1 days | Discharge: HOME | End: 2023-11-10
Attending: EMERGENCY MEDICINE
Payer: COMMERCIAL

## 2023-11-10 VITALS
TEMPERATURE: 98 F | HEART RATE: 59 BPM | RESPIRATION RATE: 17 BRPM | DIASTOLIC BLOOD PRESSURE: 77 MMHG | OXYGEN SATURATION: 98 % | SYSTOLIC BLOOD PRESSURE: 121 MMHG

## 2023-11-10 VITALS
WEIGHT: 151.46 LBS | OXYGEN SATURATION: 99 % | HEIGHT: 61 IN | HEART RATE: 59 BPM | DIASTOLIC BLOOD PRESSURE: 83 MMHG | SYSTOLIC BLOOD PRESSURE: 140 MMHG | RESPIRATION RATE: 18 BRPM | TEMPERATURE: 98 F

## 2023-11-10 LAB
APPEARANCE UR: CLEAR — SIGNIFICANT CHANGE UP
APPEARANCE UR: CLEAR — SIGNIFICANT CHANGE UP
BACTERIA # UR AUTO: ABNORMAL /HPF
BACTERIA # UR AUTO: ABNORMAL /HPF
BILIRUB UR-MCNC: NEGATIVE — SIGNIFICANT CHANGE UP
BILIRUB UR-MCNC: NEGATIVE — SIGNIFICANT CHANGE UP
CAST: 0 /LPF — SIGNIFICANT CHANGE UP (ref 0–4)
CAST: 0 /LPF — SIGNIFICANT CHANGE UP (ref 0–4)
COLOR SPEC: YELLOW — SIGNIFICANT CHANGE UP
COLOR SPEC: YELLOW — SIGNIFICANT CHANGE UP
DIFF PNL FLD: ABNORMAL
DIFF PNL FLD: ABNORMAL
GLUCOSE UR QL: NEGATIVE MG/DL — SIGNIFICANT CHANGE UP
GLUCOSE UR QL: NEGATIVE MG/DL — SIGNIFICANT CHANGE UP
KETONES UR-MCNC: NEGATIVE MG/DL — SIGNIFICANT CHANGE UP
KETONES UR-MCNC: NEGATIVE MG/DL — SIGNIFICANT CHANGE UP
LEUKOCYTE ESTERASE UR-ACNC: ABNORMAL
LEUKOCYTE ESTERASE UR-ACNC: ABNORMAL
NITRITE UR-MCNC: NEGATIVE — SIGNIFICANT CHANGE UP
NITRITE UR-MCNC: NEGATIVE — SIGNIFICANT CHANGE UP
PH UR: 7.5 — SIGNIFICANT CHANGE UP (ref 5–8)
PH UR: 7.5 — SIGNIFICANT CHANGE UP (ref 5–8)
PROT UR-MCNC: SIGNIFICANT CHANGE UP MG/DL
PROT UR-MCNC: SIGNIFICANT CHANGE UP MG/DL
RBC CASTS # UR COMP ASSIST: 33 /HPF — HIGH (ref 0–4)
RBC CASTS # UR COMP ASSIST: 33 /HPF — HIGH (ref 0–4)
SP GR SPEC: 1.01 — SIGNIFICANT CHANGE UP (ref 1–1.03)
SP GR SPEC: 1.01 — SIGNIFICANT CHANGE UP (ref 1–1.03)
SQUAMOUS # UR AUTO: 1 /HPF — SIGNIFICANT CHANGE UP (ref 0–5)
SQUAMOUS # UR AUTO: 1 /HPF — SIGNIFICANT CHANGE UP (ref 0–5)
UROBILINOGEN FLD QL: 1 MG/DL — SIGNIFICANT CHANGE UP (ref 0.2–1)
UROBILINOGEN FLD QL: 1 MG/DL — SIGNIFICANT CHANGE UP (ref 0.2–1)
WBC UR QL: 99 /HPF — HIGH (ref 0–5)
WBC UR QL: 99 /HPF — HIGH (ref 0–5)

## 2023-11-10 PROCEDURE — 87086 URINE CULTURE/COLONY COUNT: CPT

## 2023-11-10 PROCEDURE — 99284 EMERGENCY DEPT VISIT MOD MDM: CPT

## 2023-11-10 PROCEDURE — 81001 URINALYSIS AUTO W/SCOPE: CPT

## 2023-11-10 PROCEDURE — T1013: CPT

## 2023-11-10 RX ORDER — CEPHALEXIN 500 MG
500 CAPSULE ORAL ONCE
Refills: 0 | Status: COMPLETED | OUTPATIENT
Start: 2023-11-10 | End: 2023-11-10

## 2023-11-10 RX ORDER — CEPHALEXIN 500 MG
1 CAPSULE ORAL
Qty: 21 | Refills: 0
Start: 2023-11-10 | End: 2023-11-16

## 2023-11-10 RX ADMIN — Medication 500 MILLIGRAM(S): at 18:44

## 2023-11-10 NOTE — ED ADULT NURSE NOTE - CHIEF COMPLAINT QUOTE
gave birth vaginally a few days ago.  one of her perineal sutures fell out. states more drainage than usual.

## 2023-11-10 NOTE — ED PROVIDER NOTE - CARE PLAN
1 Principal Discharge DX:	Secondary perineal tear in the puerperium   Principal Discharge DX:	Secondary perineal tear in the puerperium  Secondary Diagnosis:	UTI (urinary tract infection)

## 2023-11-10 NOTE — ED PROVIDER NOTE - OBJECTIVE STATEMENT
27 years old female status post vaginal delivery on November 5, 2023 status post vaginal tear during delivery. pt with sutured post partum due to perineal tear status post repaired. pt is presents emergency room complaining of the suture falling off. patient states she has some pain and bleeding at the area. denies any fever or chills, denies any abdominal pain, or nausea vomiting. patient states she has smell in her urine otherwise no complaint.

## 2023-11-10 NOTE — CONSULT NOTE ADULT - SUBJECTIVE AND OBJECTIVE BOX
GISELLE ALMANZA is a 26yo  ppd 3 s/p Normal spontaneous vaginal delivery who presents after noticing one of her vaginal sutures came loose. She reports normal lochia. She denies any fevers, chills, or unusual discharge.     ROS:  Gen: no fatigue  CV: no chest pain  Resp: no SOB, wheezing  Neuro: no vision change, headache  GI: no abdominal pain, diarrhea, constipation  : no dysuria, increased frequency, urge  Gyn: no abnormal discharge  ID: no fevers, chills  Int: no rash  MSK: no weakness    PAST MEDICAL & SURGICAL HISTORY:  Anemia      POB:  s/p   PGYN: -fibroids, -ovarian cysts, denies STD hx, denies abnormal PAPs   PMH: Denies  PSH: Denies  SH: Denies EtOH, tobacco and illicit drug use; feels safe at home   Meds: PNVs, vit B12, IV iron  Allergies: NKDA        Height (cm): 154.9 (11-10-23 @ 16:25)  Weight (kg): 68.7 (11-10-23 @ 16:25)  BMI (kg/m2): 28.6 (11-10-23 @ 16:25)  BSA (m2): 1.68 (11-10-23 @ 16:25)  T(C): 36.7 (11-10-23 @ 16:25), Max: 36.7 (11-10-23 @ 16:25)  HR: 59 (11-10-23 @ 16:25) (59 - 59)  BP: 140/83 (11-10-23 @ 16:25) (140/83 - 140/83)  RR: 18 (11-10-23 @ 16:25) (18 - 18)  SpO2: 99% (11-10-23 @ 16:25) (99% - 99%)    Physical Exam:  Gen: alert oriented no acute distress  Pulm: normal inspiratory effort on RA  Abd: soft non-tender, fundus, firm below umbilicus   Gyn:    external genitalia normal in apperance no lesions. Well healing vaginal repair with one suture loose. No active bleeding, no abnormal discharge.   Msk: no erythema or swelling                  
no

## 2023-11-10 NOTE — CONSULT NOTE ADULT - ATTENDING COMMENTS
Agree with above assessment and plan.  Pt  is a 26yo  ppd 3 s/p Normal spontaneous vaginal delivery who presents after noticing one of her vaginal sutures came loose. She reports normal lochia. She denies any fevers, chills, or unusual discharge.   As above, no evidence of wound separation; perineal repair overall intact.  Pt advised to do sitz baths several times daily and return for her PP exam.  Of note, pt was noted to have a bp of 140/83.  Pt is asymptomatic with no complaint of headache, visual changes or epigastric discomfort.   Will continue to monitor her bp and if bps continue to be in mild range will transfer to L&D for PP PEC workup.    CHANO Millan

## 2023-11-10 NOTE — ED PROVIDER NOTE - CLINICAL SUMMARY MEDICAL DECISION MAKING FREE TEXT BOX
27 years old female status post vaginal delivery on November 5, 2023 status post vaginal tear during delivery. pt with sutured post partum due to perineal tear status post repaired. pt is presents emergency room complaining of the suture falling off. patient states she has some pain and bleeding at the area. denies any fever or chills, denies any abdominal pain, or nausea vomiting. patient states she has smell in her urine otherwise no complaint.    OB consult- check for UA or culture   mild elevated blood pressure today repeat the blood pressure as per OB

## 2023-11-10 NOTE — ED PROVIDER NOTE - PHYSICAL EXAMINATION
Const: AOX3 nontoxic appearing, no apparent respiratory or physical distress. Stable gait   HEENT: NC/AT. Moist mucous membranes.  Eyes: JEFF. EOMI  Neck: Soft and supple. Full ROM without pain.  Cardiac: Regular rate and regular rhythm. +S1/S2.  Resp: Speaking in full sentences. No evidence of respiratory distress.   Abd: Soft, non-tender, non-distended. Normal bowel sounds in all 4 quadrants. No guarding or rebound.  Neuro: Awake, alert & oriented x 3. Moves all extremities symmetrically.    exam:  done with the present of the Dr. Denny and normal   no active bleeding noted- status post vaginal tear inferiorly -edge of wound is open- no discharge noted

## 2023-11-10 NOTE — ED PROVIDER NOTE - PROGRESS NOTE DETAILS
patient seen by the OB team consult appreciated- did not recommended any resuturing the area.   pending UA we will recheck the blood pressure given the patient history of water washes the vaginal area explained as will be recommended given the patient Instruction use the Frannie  for vaginal washing as per recommended by OB/GYN   be agreed to use Keflex for UTI   ED return precaution given to the patient. there was as per OB follow-up been a charge clinic   used Amrik crandall

## 2023-11-10 NOTE — ED PROVIDER NOTE - ATTENDING APP SHARED VISIT CONTRIBUTION OF CARE
pt with sutured post partum perineal tear  one stich popped out  pe as documented  agree w pe  agree w gyn consult  likely local care and po AB  agree w eval and mngt

## 2023-11-10 NOTE — ED PROVIDER NOTE - PATIENT PORTAL LINK FT
You can access the FollowMyHealth Patient Portal offered by [facility] by registering at the following website: [email or web address]. By joining [employer]’s FollowMyHealth portal, you will also be able to view your health information using other applications (apps) compatible with our system.

## 2023-11-10 NOTE — CONSULT NOTE ADULT - ASSESSMENT
GISELLE ALMANZA is a 28yo  ppd 3 s/p Normal spontaneous vaginal delivery who presents after noticing one of her vaginal sutures came loose.   Afebrile, no unusual drainage no concern for infection at the site of suture repair. No active bleeding at the site of second degree laceration. No need for replacement stitch, may use sitz baths for perineal comfort. BP's noted to be elevated, ED will repeat BP's if elevated recc to call ob/gyn team for further post partum PEC evaluation.       Case discussed with Dr. Aldana

## 2023-11-10 NOTE — ED PROVIDER NOTE - NSFOLLOWUPINSTRUCTIONS_ED_ALL_ED_FT
Limpie el área con agua esterilizada y use solitario sin receta josefina se le explicó.   brendan el antibiótico según lo recetado  : Seguimiento con la clínica HEIDY   Regrese a la toshia de emergencias si presenta sangrado, fiebre, escalofríos, dolor nominal, dolor de espalda o cualquier inquietud nueva.    Infección del tracto urinario    Silvia infección del tracto urinario (ITU) es silvia infección de cualquier parte del tracto urinario, que incluye los riñones, los uréteres, la vejiga y la uretra. Los factores de riesgo incluyen ignorar la necesidad de orinar, limpiarse de atrás hacia adelante si es evelyn, ser un hombre no circuncidado y tener diabetes o un sistema inmunológico débil. Los síntomas incluyen micción frecuente, dolor o ardor al orinar, orina con mal olor, orina turbia, dolor en la parte inferior del abdomen, sabiha en la orina y fiebre. Si le recetaron un antibiótico, tómelo según las indicaciones de sandoval proveedor de atención médica. No deje de brendan el antibiótico incluso si comienza a sentirse mejor.    BUSQUE ATENCIÓN MÉDICA INMEDIATA SI TIENE ALGUNO DE LOS SIGUIENTES SÍNTOMAS: dolor intenso de espalda o abdominal, fiebre, incapacidad para retener líquidos o medicamentos, mareos/aturdimiento o un cambio en el estado mental.

## 2023-11-10 NOTE — ED ADULT TRIAGE NOTE - CHIEF COMPLAINT QUOTE
Knee brace applied to right knee by matt RASMUSSEN. Patient tolerated well. Crutches were given to the patient, adjusted them and provided complete instructions on safe use.        Tristian Palma RN  06[birth year]21 [id number] gave birth vaginally a few days ago.  one of her perineal sutures fell out. states more drainage than usual.

## 2023-11-10 NOTE — ED ADULT NURSE NOTE - OBJECTIVE STATEMENT
Pt presents to ED s/p suture falling out. Pt states she gave birth 11/5/23. Today she noticed her suture fell out. having mild spotting when she wipes.

## 2023-11-10 NOTE — ED ADULT NURSE NOTE - CAS DISCH TRANSFER METHOD
Problem: Pain Management  Goal: Pain level will decrease to patient's comfort goal  Outcome: Progressing     Problem: Knowledge Deficit - Standard  Goal: Patient and family/care givers will demonstrate understanding of plan of care, disease process/condition, diagnostic tests and medications  Outcome: Progressing     Problem: Fall Risk  Goal: Patient will remain free from falls  Outcome: Progressing     Problem: Mobility  Goal: Patient's capacity to carry out activities will improve  Outcome: Progressing        Private car

## 2023-11-10 NOTE — ED PROVIDER NOTE - NS ED ATTENDING STATEMENT MOD
This was a shared visit with the AYO. I reviewed and verified the documentation and independently performed the documented:

## 2023-11-11 ENCOUNTER — EMERGENCY (EMERGENCY)
Facility: HOSPITAL | Age: 28
LOS: 1 days | Discharge: HOME | End: 2023-11-11
Attending: EMERGENCY MEDICINE
Payer: COMMERCIAL

## 2023-11-11 VITALS
WEIGHT: 152.12 LBS | HEIGHT: 61 IN | DIASTOLIC BLOOD PRESSURE: 88 MMHG | RESPIRATION RATE: 20 BRPM | HEART RATE: 77 BPM | SYSTOLIC BLOOD PRESSURE: 133 MMHG | OXYGEN SATURATION: 98 % | TEMPERATURE: 98 F

## 2023-11-11 PROBLEM — D64.9 ANEMIA, UNSPECIFIED: Chronic | Status: ACTIVE | Noted: 2023-11-04

## 2023-11-11 LAB
CULTURE RESULTS: SIGNIFICANT CHANGE UP
CULTURE RESULTS: SIGNIFICANT CHANGE UP
SPECIMEN SOURCE: SIGNIFICANT CHANGE UP
SPECIMEN SOURCE: SIGNIFICANT CHANGE UP

## 2023-11-11 PROCEDURE — 99283 EMERGENCY DEPT VISIT LOW MDM: CPT

## 2023-11-11 PROCEDURE — 99282 EMERGENCY DEPT VISIT SF MDM: CPT

## 2023-11-11 PROCEDURE — T1013: CPT

## 2023-11-11 NOTE — ED PROVIDER NOTE - OBJECTIVE STATEMENT
ED : Amrik, 27 year old female with PMHx of Anemia presents to the ED 7 days post-partum c/o one episode of passing a large blood clot with mild discomfort. Denies heavy bleeding. Denies foul smell. Denies any weakness. Follow up appointment on 11/28.

## 2023-11-11 NOTE — ED PROVIDER NOTE - PATIENT PORTAL LINK FT
You can access the FollowMyHealth Patient Portal offered by [facility] by registering at the following website: [email or web address]. By joining [employer]s FollowMyHealth portal, you will also be able to view your health information using other applications (apps) compatible with our system.

## 2023-11-11 NOTE — ED PROVIDER NOTE - NSFOLLOWUPINSTRUCTIONS_ED_ALL_ED_FT
Regresa si tiene mucho dolor, o mucho sabiha,  o la sabiha tiene mal odor, o tiene fiebre, o mucho sueno

## 2025-04-28 NOTE — OB PROVIDER DELIVERY SUMMARY - NSSELHIDDEN_OBGYN_ALL_OB_FT
Make an appointment with Hand Surgery   Continue wearing wrist brace  Continue taking ibuprofen and tylenol for pain  
Abnormal EKG, possible STEMI
[email or web address]_[email or web address]:[id number]],[[email or web address]_ALL_OB_FT:[id number]]
